# Patient Record
Sex: MALE | Race: WHITE | Employment: FULL TIME | URBAN - METROPOLITAN AREA
[De-identification: names, ages, dates, MRNs, and addresses within clinical notes are randomized per-mention and may not be internally consistent; named-entity substitution may affect disease eponyms.]

---

## 2019-11-22 ENCOUNTER — HOSPITAL ENCOUNTER (EMERGENCY)
Age: 29
Discharge: HOME | End: 2019-11-22
Attending: EMERGENCY MEDICINE
Payer: COMMERCIAL

## 2019-11-22 ENCOUNTER — APPOINTMENT (OUTPATIENT)
Dept: ULTRASOUND IMAGING | Age: 29
End: 2019-11-22
Attending: NURSE PRACTITIONER
Payer: COMMERCIAL

## 2019-11-22 ENCOUNTER — APPOINTMENT (OUTPATIENT)
Dept: GENERAL RADIOLOGY | Age: 29
End: 2019-11-22
Attending: NURSE PRACTITIONER
Payer: COMMERCIAL

## 2019-11-22 ENCOUNTER — APPOINTMENT (OUTPATIENT)
Dept: CT IMAGING | Age: 29
End: 2019-11-22
Attending: EMERGENCY MEDICINE
Payer: COMMERCIAL

## 2019-11-22 VITALS
WEIGHT: 315 LBS | DIASTOLIC BLOOD PRESSURE: 71 MMHG | RESPIRATION RATE: 16 BRPM | OXYGEN SATURATION: 96 % | BODY MASS INDEX: 44.1 KG/M2 | HEIGHT: 71 IN | TEMPERATURE: 98.6 F | HEART RATE: 114 BPM | SYSTOLIC BLOOD PRESSURE: 142 MMHG

## 2019-11-22 DIAGNOSIS — R51.9 NONINTRACTABLE EPISODIC HEADACHE, UNSPECIFIED HEADACHE TYPE: ICD-10-CM

## 2019-11-22 DIAGNOSIS — R74.01 TRANSAMINITIS: Primary | ICD-10-CM

## 2019-11-22 DIAGNOSIS — B27.90 INFECTIOUS MONONUCLEOSIS WITHOUT COMPLICATION, INFECTIOUS MONONUCLEOSIS DUE TO UNSPECIFIED ORGANISM: ICD-10-CM

## 2019-11-22 LAB
ALBUMIN SERPL-MCNC: 3.3 G/DL (ref 3.5–5)
ALBUMIN/GLOB SERPL: 0.8 {RATIO} (ref 1.1–2.2)
ALP SERPL-CCNC: 184 U/L (ref 45–117)
ALT SERPL-CCNC: 366 U/L (ref 12–78)
ANION GAP SERPL CALC-SCNC: 8 MMOL/L (ref 5–15)
APPEARANCE UR: CLEAR
AST SERPL-CCNC: 212 U/L (ref 15–37)
BASOPHILS # BLD: 0 K/UL (ref 0–0.1)
BASOPHILS NFR BLD: 1 % (ref 0–1)
BILIRUB SERPL-MCNC: 0.6 MG/DL (ref 0.2–1)
BILIRUB UR QL: NEGATIVE
BUN SERPL-MCNC: 13 MG/DL (ref 6–20)
BUN/CREAT SERPL: 15 (ref 12–20)
CALCIUM SERPL-MCNC: 8.6 MG/DL (ref 8.5–10.1)
CHLORIDE SERPL-SCNC: 103 MMOL/L (ref 97–108)
CO2 SERPL-SCNC: 26 MMOL/L (ref 21–32)
COLOR UR: NORMAL
COMMENT, HOLDF: NORMAL
CREAT SERPL-MCNC: 0.88 MG/DL (ref 0.7–1.3)
DIFFERENTIAL METHOD BLD: ABNORMAL
EOSINOPHIL # BLD: 0 K/UL (ref 0–0.4)
EOSINOPHIL NFR BLD: 0 % (ref 0–7)
ERYTHROCYTE [DISTWIDTH] IN BLOOD BY AUTOMATED COUNT: 14.9 % (ref 11.5–14.5)
FLUAV AG NPH QL IA: NEGATIVE
FLUBV AG NOSE QL IA: NEGATIVE
GLOBULIN SER CALC-MCNC: 4.3 G/DL (ref 2–4)
GLUCOSE SERPL-MCNC: 95 MG/DL (ref 65–100)
GLUCOSE UR STRIP.AUTO-MCNC: NEGATIVE MG/DL
HAV IGM SER QL: NONREACTIVE
HBV CORE IGM SER QL: NONREACTIVE
HBV SURFACE AG SER QL: <0.1 INDEX
HBV SURFACE AG SER QL: NEGATIVE
HCT VFR BLD AUTO: 50.6 % (ref 36.6–50.3)
HCV AB SERPL QL IA: NONREACTIVE
HCV COMMENT,HCGAC: NORMAL
HETEROPH AB BLD QL IA: POSITIVE
HGB BLD-MCNC: 16.8 G/DL (ref 12.1–17)
HGB UR QL STRIP: NEGATIVE
IMM GRANULOCYTES # BLD AUTO: 0 K/UL
IMM GRANULOCYTES NFR BLD AUTO: 0 %
KETONES UR QL STRIP.AUTO: NEGATIVE MG/DL
LEUKOCYTE ESTERASE UR QL STRIP.AUTO: NEGATIVE
LIPASE SERPL-CCNC: 104 U/L (ref 73–393)
LYMPHOCYTES # BLD: 1.5 K/UL (ref 0.8–3.5)
LYMPHOCYTES NFR BLD: 32 % (ref 12–49)
MCH RBC QN AUTO: 28.3 PG (ref 26–34)
MCHC RBC AUTO-ENTMCNC: 33.2 G/DL (ref 30–36.5)
MCV RBC AUTO: 85.2 FL (ref 80–99)
MONOCYTES # BLD: 0.8 K/UL (ref 0–1)
MONOCYTES NFR BLD: 17 % (ref 5–13)
NEUTS SEG # BLD: 2.3 K/UL (ref 1.8–8)
NEUTS SEG NFR BLD: 50 % (ref 32–75)
NITRITE UR QL STRIP.AUTO: NEGATIVE
NRBC # BLD: 0 K/UL (ref 0–0.01)
NRBC BLD-RTO: 0 PER 100 WBC
PH UR STRIP: 5.5 [PH] (ref 5–8)
PLATELET # BLD AUTO: 437 K/UL (ref 150–400)
PMV BLD AUTO: 8.9 FL (ref 8.9–12.9)
POTASSIUM SERPL-SCNC: 4.2 MMOL/L (ref 3.5–5.1)
PROT SERPL-MCNC: 7.6 G/DL (ref 6.4–8.2)
PROT UR STRIP-MCNC: NEGATIVE MG/DL
RBC # BLD AUTO: 5.94 M/UL (ref 4.1–5.7)
RBC MORPH BLD: ABNORMAL
SAMPLES BEING HELD,HOLD: NORMAL
SODIUM SERPL-SCNC: 137 MMOL/L (ref 136–145)
SP GR UR REFRACTOMETRY: 1.02 (ref 1–1.03)
SP1: NORMAL
SP2: NORMAL
SP3: NORMAL
UROBILINOGEN UR QL STRIP.AUTO: 0.2 EU/DL (ref 0.2–1)
WBC # BLD AUTO: 4.6 K/UL (ref 4.1–11.1)
WBC MORPH BLD: ABNORMAL

## 2019-11-22 PROCEDURE — 81003 URINALYSIS AUTO W/O SCOPE: CPT

## 2019-11-22 PROCEDURE — 70450 CT HEAD/BRAIN W/O DYE: CPT

## 2019-11-22 PROCEDURE — 76705 ECHO EXAM OF ABDOMEN: CPT

## 2019-11-22 PROCEDURE — 96361 HYDRATE IV INFUSION ADD-ON: CPT

## 2019-11-22 PROCEDURE — 74011250636 HC RX REV CODE- 250/636: Performed by: NURSE PRACTITIONER

## 2019-11-22 PROCEDURE — 99285 EMERGENCY DEPT VISIT HI MDM: CPT

## 2019-11-22 PROCEDURE — 85025 COMPLETE CBC W/AUTO DIFF WBC: CPT

## 2019-11-22 PROCEDURE — 74011636320 HC RX REV CODE- 636/320: Performed by: RADIOLOGY

## 2019-11-22 PROCEDURE — 96360 HYDRATION IV INFUSION INIT: CPT

## 2019-11-22 PROCEDURE — 80074 ACUTE HEPATITIS PANEL: CPT

## 2019-11-22 PROCEDURE — 83690 ASSAY OF LIPASE: CPT

## 2019-11-22 PROCEDURE — 86308 HETEROPHILE ANTIBODY SCREEN: CPT

## 2019-11-22 PROCEDURE — 74177 CT ABD & PELVIS W/CONTRAST: CPT

## 2019-11-22 PROCEDURE — 87804 INFLUENZA ASSAY W/OPTIC: CPT

## 2019-11-22 PROCEDURE — 36415 COLL VENOUS BLD VENIPUNCTURE: CPT

## 2019-11-22 PROCEDURE — 80053 COMPREHEN METABOLIC PANEL: CPT

## 2019-11-22 PROCEDURE — 71046 X-RAY EXAM CHEST 2 VIEWS: CPT

## 2019-11-22 PROCEDURE — 74011250636 HC RX REV CODE- 250/636: Performed by: EMERGENCY MEDICINE

## 2019-11-22 PROCEDURE — 74011250637 HC RX REV CODE- 250/637: Performed by: EMERGENCY MEDICINE

## 2019-11-22 RX ORDER — KETOROLAC TROMETHAMINE 30 MG/ML
30 INJECTION, SOLUTION INTRAMUSCULAR; INTRAVENOUS ONCE
Status: DISCONTINUED | OUTPATIENT
Start: 2019-11-22 | End: 2019-11-23 | Stop reason: HOSPADM

## 2019-11-22 RX ORDER — DIPHENHYDRAMINE HCL 25 MG
25 CAPSULE ORAL
Status: DISCONTINUED | OUTPATIENT
Start: 2019-11-22 | End: 2019-11-23 | Stop reason: HOSPADM

## 2019-11-22 RX ORDER — METOCLOPRAMIDE 10 MG/1
10 TABLET ORAL
Status: DISCONTINUED | OUTPATIENT
Start: 2019-11-22 | End: 2019-11-23 | Stop reason: HOSPADM

## 2019-11-22 RX ORDER — DEXAMETHASONE SODIUM PHOSPHATE 4 MG/ML
10 INJECTION, SOLUTION INTRA-ARTICULAR; INTRALESIONAL; INTRAMUSCULAR; INTRAVENOUS; SOFT TISSUE
Status: DISCONTINUED | OUTPATIENT
Start: 2019-11-22 | End: 2019-11-23 | Stop reason: HOSPADM

## 2019-11-22 RX ORDER — IBUPROFEN 800 MG/1
800 TABLET ORAL
Status: COMPLETED | OUTPATIENT
Start: 2019-11-22 | End: 2019-11-22

## 2019-11-22 RX ADMIN — SODIUM CHLORIDE 1000 ML: 900 INJECTION, SOLUTION INTRAVENOUS at 19:47

## 2019-11-22 RX ADMIN — IBUPROFEN 800 MG: 800 TABLET, FILM COATED ORAL at 20:36

## 2019-11-22 RX ADMIN — SODIUM CHLORIDE 1000 ML: 900 INJECTION, SOLUTION INTRAVENOUS at 16:44

## 2019-11-22 RX ADMIN — IOPAMIDOL 100 ML: 755 INJECTION, SOLUTION INTRAVENOUS at 18:47

## 2019-11-22 NOTE — ED PROVIDER NOTES
I have evaluated the patient as the Provider in Triage. I have reviewed His vital signs and the triage nurse assessment. I have talked with the patient and any available family and advised that I am the provider in triage and have ordered the appropriate study to initiate their work up based on the clinical presentation during my assessment. I have advised that the patient will be accommodated in the Main ED as soon as possible. I have also requested to contact the triage nurse or myself immediately if the patient experiences any changes in their condition during this brief waiting period. Note written by Davonte Gunn, as dictated by Samira Adams NP 4:12 PM    ----------------------------    Date of Service:  11/22/2019    Patient:  Angel Washington    Chief Complaint:  Mass; Fever; and Elevated Liver Enzymes     HPI:  nAgel Washington is a 34 y.o. male. Pt was sent to the ED by Dr. Cindi Lopez from [facility] to be evaluated for elevated LFTs and swollen lymph nodes. Followed by Dr. Cindi Lopez for mild hypothyroidism. Pt reports having swollen lymph nodes on the left side of his face for 2-3 weeks, intermittent tremors, sinus pressure, throbbing headache, diaphoretic episodes upon waking, some chest pressure accompanied by 1 episode of emesis earlier today following food intake, and unstable body temperatures lately. Notes chest pressure has subsided. To elaborate on temperatures, mother reports the pt has been febrile up to 100.2F but also down to 91F. Has been taking oral temperatures using a thermometer they bought yesterday. As per the family, the pt has been seen numerous times for his current troubles. Pt is currently on his 3rd round of abx after being initially treated for bee stings and then twice for sinus infection. States he has been on keflex, doxycycline, and now on amoxicillin which he started on 11/21/19. States that his condition has been worsening each time he starts new medications. Had blood work drawn while seeing his PCP yesterday 11/21/19 which showed the elevated LFTs. Hx splenectomy. Pt is a  for a living and works 6 days a week. Pt denies abdominal pain, LE pain, visual disturbances, hearing changes, sore throat, recent international travel, prolonged periods of times outdoors, or any current pain. PCP: Gt Toure MD    Note written by Davonte Dasilva, as dictated by Dex West, DO 4:53PM.    The history is provided by the patient and a parent. No  was used. Past Medical History:   Diagnosis Date    ITP (idiopathic thrombocytopenic purpura) ([facility])        Past Surgical History:   Procedure Laterality Date    HX ADENOIDECTOMY      HX SPLENECTOMY      HX TONSILLECTOMY      HX WISDOM TEETH EXTRACTION           No family history on file.     Social History     Socioeconomic History    Marital status: SINGLE     Spouse name: Not on file    Number of children: Not on file    Years of education: Not on file    Highest education level: Not on file   Occupational History    Not on file   Social Needs    Financial resource strain: Not on file    Food insecurity:     Worry: Not on file     Inability: Not on file    Transportation needs:     Medical: Not on file     Non-medical: Not on file   Tobacco Use    Smoking status: Former Smoker   Substance and Sexual Activity    Alcohol use: Yes     Comment: rarely    Drug use: Not on file    Sexual activity: Not on file   Lifestyle    Physical activity:     Days per week: Not on file     Minutes per session: Not on file    Stress: Not on file   Relationships    Social connections:     Talks on phone: Not on file     Gets together: Not on file     Attends Sikh service: Not on file     Active member of club or organization: Not on file     Attends meetings of clubs or organizations: Not on file     Relationship status: Not on file    Intimate partner violence:     Fear of current or ex partner: Not on file     Emotionally abused: Not on file     Physically abused: Not on file     Forced sexual activity: Not on file   Other Topics Concern    Not on file   Social History Narrative    Not on file         ALLERGIES: Levaquin [levofloxacin]    Review of Systems   Constitutional: Positive for diaphoresis and fever. HENT: Positive for facial swelling and sinus pressure. Negative for hearing loss and sore throat. Eyes: Negative for visual disturbance. Respiratory: Negative for shortness of breath. Cardiovascular: Positive for chest pain. Gastrointestinal: Positive for vomiting. Negative for abdominal pain. Genitourinary: Negative for flank pain. Musculoskeletal: Negative for back pain. Skin: Negative for rash. Neurological: Positive for tremors and headaches. Negative for dizziness and light-headedness. Psychiatric/Behavioral: Negative for suicidal ideas. All other systems reviewed and are negative. Vitals:    11/22/19 1647 11/22/19 1652 11/22/19 1701 11/22/19 1815   BP:  123/76 145/69 142/71   Pulse: (!) 105  (!) 114    Resp: 20  16    Temp: 99 °F (37.2 °C)  98.6 °F (37 °C)    SpO2:  97% 96% 96%   Weight:       Height:                Physical Exam  Constitutional:       General: He is not in acute distress. Appearance: He is well-developed. HENT:      Head: Normocephalic and atraumatic. Right Ear: Tympanic membrane normal.      Left Ear: Tympanic membrane normal.      Nose: Nose normal.      Mouth/Throat:      Mouth: Mucous membranes are dry. Pharynx: No oropharyngeal exudate or posterior oropharyngeal erythema. Eyes:      General: No scleral icterus. Extraocular Movements: Extraocular movements intact. Conjunctiva/sclera: Conjunctivae normal.      Pupils: Pupils are equal, round, and reactive to light. Neck:      Musculoskeletal: Neck supple. No neck rigidity or muscular tenderness. Vascular: No JVD.       Trachea: No tracheal deviation. Cardiovascular:      Rate and Rhythm: Normal rate and regular rhythm. Heart sounds: No murmur. No friction rub. Pulmonary:      Effort: Pulmonary effort is normal. No respiratory distress. Breath sounds: Normal breath sounds. Abdominal:      General: There is no distension. Palpations: Abdomen is soft. Tenderness: There is no tenderness. Musculoskeletal: Normal range of motion. General: No swelling, tenderness or deformity. Right lower leg: No edema. Left lower leg: No edema. Lymphadenopathy:      Comments: Left sided cervical chain of lymph nodes   Skin:     General: Skin is warm and dry. Capillary Refill: Capillary refill takes less than 2 seconds. Coloration: Skin is not jaundiced. Findings: No rash. Neurological:      General: No focal deficit present. Mental Status: He is alert and oriented to person, place, and time. Mental status is at baseline. Psychiatric:         Behavior: Behavior normal.         Thought Content: Thought content normal.         Judgment: Judgment normal.     Note written by Davonte Edmondson, as dictated by No att. providers found 4:53PM       MDM  Number of Diagnoses or Management Options  Infectious mononucleosis without complication, infectious mononucleosis due to unspecified organism:   Nonintractable episodic headache, unspecified headache type:   Transaminitis:        VITAL SIGNS:  No data found.       LABS:  Recent Results (from the past 6 hour(s))   INFLUENZA A & B AG (RAPID TEST)    Collection Time: 11/22/19  4:47 PM   Result Value Ref Range    Influenza A Antigen NEGATIVE  NEG      Influenza B Antigen NEGATIVE  NEG     URINALYSIS W/ RFLX MICROSCOPIC    Collection Time: 11/22/19  6:14 PM   Result Value Ref Range    Color YELLOW/STRAW      Appearance CLEAR CLEAR      Specific gravity 1.017 1.003 - 1.030      pH (UA) 5.5 5.0 - 8.0      Protein NEGATIVE  NEG mg/dL    Glucose NEGATIVE  NEG mg/dL    Ketone NEGATIVE  NEG mg/dL    Bilirubin NEGATIVE  NEG      Blood NEGATIVE  NEG      Urobilinogen 0.2 0.2 - 1.0 EU/dL    Nitrites NEGATIVE  NEG      Leukocyte Esterase NEGATIVE  NEG          IMAGING:  CT HEAD WO CONT   Final Result   IMPRESSION:   1. No evidence of intracranial abnormality. CT ABD PELV W CONT   Final Result   IMPRESSION: No acute findings or findings to correlate with transaminitis. Status post splenectomy with probable postsurgical changes in small splenule at   the pancreatic tail. US ABD LTD   Final Result   IMPRESSION: Partially distended gallbladder and obscured pancreas. No acute   findings. XR CHEST PA LAT   Final Result   Impression: No acute process. No pneumonia               Medications During Visit:  Medications   ketorolac (TORADOL) injection 30 mg (has no administration in time range)   dexamethasone (DECADRON) 4 mg/mL injection 10 mg (has no administration in time range)   diphenhydrAMINE (BENADRYL) capsule 25 mg (has no administration in time range)   metoclopramide HCl (REGLAN) tablet 10 mg (has no administration in time range)   sodium chloride 0.9 % bolus infusion 1,000 mL (0 mL IntraVENous IV Completed 11/22/19 2136)   iopamidol (ISOVUE-370) 76 % injection 100 mL (100 mL IntraVENous Given 11/22/19 1847)   sodium chloride 0.9 % bolus infusion 1,000 mL (0 mL IntraVENous IV Completed 11/22/19 2135)   ibuprofen (MOTRIN) tablet 800 mg (800 mg Oral Given 11/22/19 2036)         DECISION MAKING:  Madison Mckeon is a 34 y.o. male who comes in as above. Here, labs and imaging are as above. Based on the patient's multiple complaints additional imaging was obtained. Is felt that the patient does have mononucleosis which would be the cause of his transaminitis. As he has no other risk factors for hepatitis or any other acute to discharge him home. He will be provided with medication for his headache. .  At this time he is instructed to follow-up with his family doctor for repeat blood work next week and to follow-up on his transaminitis. He feels comfortable to follow-up as an outpatient as is and will return as needed      IMPRESSION:  1. Transaminitis    2. Infectious mononucleosis without complication, infectious mononucleosis due to unspecified organism    3. Nonintractable episodic headache, unspecified headache type        DISPOSITION:  Discharged      Discharge Medication List as of 11/22/2019  8:44 PM           Follow-up Information     Follow up With Specialties Details Why Contact Melba Delgadillo MD Family Practice Schedule an appointment as soon as possible for a visit   [id number] [address] [facility]  [phone]              The patient is asked to follow-up with their primary care provider in the next several days. They are to call tomorrow for an appointment. The patient is asked to return promptly for any increased concerns or worsening of symptoms. They can return to this emergency department or any other emergency department.     Procedures

## 2019-11-22 NOTE — ED TRIAGE NOTES
Sent here by Dr. Rafaela Santana from [facility] to be evaluated for elevated LFT's, swollen lymph nodes, and report of feeling generally unwell x 1 month. Pt does not have spleen. Mother reports wildly varying temperatures.

## 2021-11-04 ENCOUNTER — HOSPITAL ENCOUNTER (EMERGENCY)
Age: 31
Discharge: HOME | End: 2021-11-04
Attending: EMERGENCY MEDICINE
Payer: COMMERCIAL

## 2021-11-04 ENCOUNTER — APPOINTMENT (OUTPATIENT)
Dept: GENERAL RADIOLOGY | Age: 31
End: 2021-11-04
Attending: EMERGENCY MEDICINE
Payer: COMMERCIAL

## 2021-11-04 VITALS
SYSTOLIC BLOOD PRESSURE: 142 MMHG | OXYGEN SATURATION: 99 % | DIASTOLIC BLOOD PRESSURE: 92 MMHG | BODY MASS INDEX: 44.1 KG/M2 | WEIGHT: 315 LBS | HEIGHT: 71 IN | TEMPERATURE: 97.1 F | HEART RATE: 89 BPM | RESPIRATION RATE: 18 BRPM

## 2021-11-04 DIAGNOSIS — M54.6 ACUTE MIDLINE THORACIC BACK PAIN: Primary | ICD-10-CM

## 2021-11-04 DIAGNOSIS — M79.89 LEG SWELLING: ICD-10-CM

## 2021-11-04 LAB
ANION GAP SERPL CALC-SCNC: 6 MMOL/L (ref 5–15)
BASOPHILS # BLD: 0.1 K/UL (ref 0–0.1)
BASOPHILS NFR BLD: 1 % (ref 0–1)
BNP SERPL-MCNC: 14 PG/ML
BUN SERPL-MCNC: 11 MG/DL (ref 6–20)
BUN/CREAT SERPL: 14 (ref 12–20)
CALCIUM SERPL-MCNC: 9.4 MG/DL (ref 8.5–10.1)
CHLORIDE SERPL-SCNC: 102 MMOL/L (ref 97–108)
CO2 SERPL-SCNC: 29 MMOL/L (ref 21–32)
COMMENT, HOLDF: NORMAL
CREAT SERPL-MCNC: 0.79 MG/DL (ref 0.7–1.3)
D DIMER PPP FEU-MCNC: 0.21 MG/L FEU (ref 0–0.65)
DIFFERENTIAL METHOD BLD: ABNORMAL
EOSINOPHIL # BLD: 0 K/UL (ref 0–0.4)
EOSINOPHIL NFR BLD: 0 % (ref 0–7)
ERYTHROCYTE [DISTWIDTH] IN BLOOD BY AUTOMATED COUNT: 14.6 % (ref 11.5–14.5)
GLUCOSE SERPL-MCNC: 95 MG/DL (ref 65–100)
HCT VFR BLD AUTO: 50.4 % (ref 36.6–50.3)
HGB BLD-MCNC: 16.2 G/DL (ref 12.1–17)
IMM GRANULOCYTES # BLD AUTO: 0 K/UL
IMM GRANULOCYTES NFR BLD AUTO: 0 %
LYMPHOCYTES # BLD: 5.2 K/UL (ref 0.8–3.5)
LYMPHOCYTES NFR BLD: 38 % (ref 12–49)
MCH RBC QN AUTO: 27.8 PG (ref 26–34)
MCHC RBC AUTO-ENTMCNC: 32.1 G/DL (ref 30–36.5)
MCV RBC AUTO: 86.6 FL (ref 80–99)
MONOCYTES # BLD: 1.2 K/UL (ref 0–1)
MONOCYTES NFR BLD: 9 % (ref 5–13)
NEUTS SEG # BLD: 7.3 K/UL (ref 1.8–8)
NEUTS SEG NFR BLD: 52 % (ref 32–75)
NRBC # BLD: 0 K/UL (ref 0–0.01)
NRBC BLD-RTO: 0 PER 100 WBC
PLATELET # BLD AUTO: 524 K/UL (ref 150–400)
PLATELET COMMENTS,PCOM: ABNORMAL
PMV BLD AUTO: 8.3 FL (ref 8.9–12.9)
POTASSIUM SERPL-SCNC: 4.3 MMOL/L (ref 3.5–5.1)
RBC # BLD AUTO: 5.82 M/UL (ref 4.1–5.7)
RBC MORPH BLD: ABNORMAL
SAMPLES BEING HELD,HOLD: NORMAL
SODIUM SERPL-SCNC: 137 MMOL/L (ref 136–145)
TROPONIN-HIGH SENSITIVITY: 5 NG/L (ref 0–76)
WBC # BLD AUTO: 13.8 K/UL (ref 4.1–11.1)
WBC MORPH BLD: ABNORMAL

## 2021-11-04 PROCEDURE — 99282 EMERGENCY DEPT VISIT SF MDM: CPT

## 2021-11-04 PROCEDURE — 84484 ASSAY OF TROPONIN QUANT: CPT

## 2021-11-04 PROCEDURE — 36415 COLL VENOUS BLD VENIPUNCTURE: CPT

## 2021-11-04 PROCEDURE — 85025 COMPLETE CBC W/AUTO DIFF WBC: CPT

## 2021-11-04 PROCEDURE — 83880 ASSAY OF NATRIURETIC PEPTIDE: CPT

## 2021-11-04 PROCEDURE — 93005 ELECTROCARDIOGRAM TRACING: CPT

## 2021-11-04 PROCEDURE — 80048 BASIC METABOLIC PNL TOTAL CA: CPT

## 2021-11-04 PROCEDURE — 85379 FIBRIN DEGRADATION QUANT: CPT

## 2021-11-04 PROCEDURE — 71046 X-RAY EXAM CHEST 2 VIEWS: CPT

## 2021-11-04 RX ORDER — IBUPROFEN 600 MG/1
600 TABLET ORAL
Qty: 20 TABLET | Refills: 0 | Status: SHIPPED | OUTPATIENT
Start: 2021-11-04

## 2021-11-04 RX ORDER — KETOROLAC TROMETHAMINE 30 MG/ML
30 INJECTION, SOLUTION INTRAMUSCULAR; INTRAVENOUS
Status: DISCONTINUED | OUTPATIENT
Start: 2021-11-04 | End: 2021-11-04 | Stop reason: HOSPADM

## 2021-11-04 NOTE — ED TRIAGE NOTES
Patient reports lower back pain with swelling on both ankles and right shoulder pain for about 2 weeks.      Patient denies SOB, CP.

## 2021-11-04 NOTE — ED PROVIDER NOTES
Figueroa Bowers is a 31 yo M with upper back pain, bilateral ankle pain and swelling. He states that he has had pain and swelling in his bilateral legs for months but this afternoon he developed aching pain in his upper back between his shoulders. Nothing makes the pain better or worse. He denies chest pain or shortness of breath. Past Medical History:   Diagnosis Date    ITP (idiopathic thrombocytopenic purpura) (HCC)        Past Surgical History:   Procedure Laterality Date    HX ADENOIDECTOMY      HX SPLENECTOMY      HX TONSILLECTOMY      HX WISDOM TEETH EXTRACTION           No family history on file. Social History     Socioeconomic History    Marital status: SINGLE     Spouse name: Not on file    Number of children: Not on file    Years of education: Not on file    Highest education level: Not on file   Occupational History    Not on file   Tobacco Use    Smoking status: Former Smoker    Smokeless tobacco: Not on file   Substance and Sexual Activity    Alcohol use: Yes     Comment: rarely    Drug use: Not on file    Sexual activity: Not on file   Other Topics Concern    Not on file   Social History Narrative    Not on file     Social Determinants of Health     Financial Resource Strain:     Difficulty of Paying Living Expenses: Not on file   Food Insecurity:     Worried About Running Out of Food in the Last Year: Not on file    Terrell of Food in the Last Year: Not on file   Transportation Needs:     Lack of Transportation (Medical): Not on file    Lack of Transportation (Non-Medical):  Not on file   Physical Activity:     Days of Exercise per Week: Not on file    Minutes of Exercise per Session: Not on file   Stress:     Feeling of Stress : Not on file   Social Connections:     Frequency of Communication with Friends and Family: Not on file    Frequency of Social Gatherings with Friends and Family: Not on file    Attends Congregational Services: Not on file    Active Member of Clubs or Organizations: Not on file    Attends Club or Organization Meetings: Not on file    Marital Status: Not on file   Intimate Partner Violence:     Fear of Current or Ex-Partner: Not on file    Emotionally Abused: Not on file    Physically Abused: Not on file    Sexually Abused: Not on file   Housing Stability:     Unable to Pay for Housing in the Last Year: Not on file    Number of [place] in the Last Year: Not on file    Unstable Housing in the Last Year: Not on file         ALLERGIES: Levaquin [levofloxacin]    Review of Systems   Constitutional: Negative for fever. HENT: Negative for sore throat. Eyes: Negative for visual disturbance. Respiratory: Negative for cough. Cardiovascular: Positive for leg swelling. Negative for chest pain. Gastrointestinal: Negative for abdominal pain. Genitourinary: Negative for dysuria. Musculoskeletal: Positive for back pain. Skin: Negative for rash. Neurological: Negative for headaches. Vitals:    11/04/21 1759   BP: (!) 142/92   Pulse: 89   Resp: 18   Temp: 97.1 °F (36.2 °C)   SpO2: 99%   Weight: (!) 187.6 kg (413 lb 9.3 oz)   Height: 5' 11\" (1.803 m)            Physical Exam  Vitals and nursing note reviewed. Constitutional:       General: He is not in acute distress. Appearance: He is well-developed. He is obese. HENT:      Head: Normocephalic and atraumatic. Eyes:      Conjunctiva/sclera: Conjunctivae normal.   Neck:      Trachea: Phonation normal.   Cardiovascular:      Rate and Rhythm: Normal rate. Heart sounds: Normal heart sounds. Pulmonary:      Effort: Pulmonary effort is normal. No respiratory distress. Breath sounds: No wheezing or rales. Abdominal:      General: There is no distension. Musculoskeletal:         General: No tenderness. Normal range of motion. Cervical back: Normal range of motion. Skin:     General: Skin is warm and dry. Neurological:      Mental Status: He is alert.  He is not disoriented. Motor: No abnormal muscle tone. ED EKG interpretation:  Rhythm: normal sinus rhythm; and regular . Rate (approx.): 86; Axis: normal; P wave: normal; QRS interval: normal ; ST/T wave: normal; Other findings: normal. This EKG was interpreted by Maricruz Brito MD,ED Provider. MDM         7:30 PM  PAtient reassessed and remains in no distress, Labs reviewed and ddimer, trop, BNP normal.  CXR normal.  Will discharge home with prescription for ibuprofen.    Procedures

## 2021-11-05 LAB
ATRIAL RATE: 86 BPM
CALCULATED P AXIS, ECG09: 55 DEGREES
CALCULATED R AXIS, ECG10: 22 DEGREES
CALCULATED T AXIS, ECG11: 21 DEGREES
DIAGNOSIS, 93000: NORMAL
P-R INTERVAL, ECG05: 154 MS
Q-T INTERVAL, ECG07: 378 MS
QRS DURATION, ECG06: 104 MS
QTC CALCULATION (BEZET), ECG08: 452 MS
VENTRICULAR RATE, ECG03: 86 BPM

## 2022-03-30 ENCOUNTER — HOSPITAL ENCOUNTER (EMERGENCY)
Age: 32
Discharge: HOME | End: 2022-03-30
Attending: STUDENT IN AN ORGANIZED HEALTH CARE EDUCATION/TRAINING PROGRAM
Payer: COMMERCIAL

## 2022-03-30 VITALS
OXYGEN SATURATION: 97 % | HEIGHT: 71 IN | DIASTOLIC BLOOD PRESSURE: 88 MMHG | HEART RATE: 104 BPM | WEIGHT: 315 LBS | BODY MASS INDEX: 44.1 KG/M2 | RESPIRATION RATE: 20 BRPM | TEMPERATURE: 98.9 F | SYSTOLIC BLOOD PRESSURE: 131 MMHG

## 2022-03-30 DIAGNOSIS — H66.006 RECURRENT ACUTE SUPPURATIVE OTITIS MEDIA WITHOUT SPONTANEOUS RUPTURE OF TYMPANIC MEMBRANE OF BOTH SIDES: Primary | ICD-10-CM

## 2022-03-30 DIAGNOSIS — R05.9 COUGH: ICD-10-CM

## 2022-03-30 PROCEDURE — 99283 EMERGENCY DEPT VISIT LOW MDM: CPT

## 2022-03-30 RX ORDER — FLUTICASONE PROPIONATE 50 MCG
2 SPRAY, SUSPENSION (ML) NASAL DAILY
Qty: 16 G | Refills: 0 | Status: SHIPPED | OUTPATIENT
Start: 2022-03-30

## 2022-03-30 NOTE — ED TRIAGE NOTES
Pt presents to ER with c/o b/l ear pain. Seen at PCP 3 weeks ago and completed augmentin and prednisone dose pain for b/l OM. Seen at [facility] 3 days ago for worsening ear pain and diagnosed with b/l ear pain and given doxycyline and tessalon without improvement. Pt c/o cough and sore throat with b/l ear pain. Pt is waiting for ENT appt. Pt denies any fevers, chills, body aches, dizziness, SOB.

## 2022-03-30 NOTE — ED PROVIDER NOTES
Please note that this dictation was completed with JumpTime, the computer voice recognition software.  Quite often unanticipated grammatical, syntax, homophones, and other interpretive errors are inadvertently transcribed by the computer software.  Please disregard these errors.  Please excuse any errors that have escaped final proofreading. Patient is a 72-year-old male with history of ITP status post splenectomy presenting to ED for evaluation of bilateral ear pain. He states that this pain started about 1 month ago, was treated with a course of Augmentin by his PCP with relief in his symptoms but feels like his pain returned 4 days ago. [facility] and was started on doxycycline for recurrent ear infection and feels slightly improved but that his ear pain is still present. He is also had a slight cough for the past 4 weeks, denies concern for Covid as he tested positive for Covid in January 2022. He has an ENT appointment scheduled in 2 days. He denies fever, chills, facial swelling, decreased hearing, trouble swallowing, shortness of breath, chest pain, or any other medical complaints at this time. Past Medical History:   Diagnosis Date    ITP (idiopathic thrombocytopenic purpura) (HCC)        Past Surgical History:   Procedure Laterality Date    HX ADENOIDECTOMY      HX SPLENECTOMY      HX TONSILLECTOMY      HX WISDOM TEETH EXTRACTION           No family history on file.     Social History     Socioeconomic History    Marital status: SINGLE     Spouse name: Not on file    Number of children: Not on file    Years of education: Not on file    Highest education level: Not on file   Occupational History    Not on file   Tobacco Use    Smoking status: Former Smoker    Smokeless tobacco: Not on file   Substance and Sexual Activity    Alcohol use: Yes     Comment: rarely    Drug use: Not on file    Sexual activity: Not on file   Other Topics Concern    Not on file   Social History Narrative    Not on file     Social Determinants of Health     Financial Resource Strain:     Difficulty of Paying Living Expenses: Not on file   Food Insecurity:     Worried About Running Out of Food in the Last Year: Not on file    Terrell of Food in the Last Year: Not on file   Transportation Needs:     Lack of Transportation (Medical): Not on file    Lack of Transportation (Non-Medical): Not on file   Physical Activity:     Days of Exercise per Week: Not on file    Minutes of Exercise per Session: Not on file   Stress:     Feeling of Stress : Not on file   Social Connections:     Frequency of Communication with Friends and Family: Not on file    Frequency of Social Gatherings with Friends and Family: Not on file    Attends Mormon Services: Not on file    Active Member of [address] [place] [address] [employer] or Organizations: Not on file    Attends Club or Organization Meetings: Not on file    Marital Status: Not on file   Intimate Partner Violence:     Fear of Current or Ex-Partner: Not on file    Emotionally Abused: Not on file    Physically Abused: Not on file    Sexually Abused: Not on file   Housing Stability:     Unable to Pay for Housing in the Last Year: Not on file    Number of [place] in the Last Year: Not on file    Unstable Housing in the Last Year: Not on file         ALLERGIES: Levaquin [levofloxacin]    Review of Systems   Constitutional: Negative for chills and fever. HENT: Positive for ear pain. Negative for sore throat. Eyes: Negative for visual disturbance. Respiratory: Positive for cough. Negative for shortness of breath. Cardiovascular: Negative for chest pain. Gastrointestinal: Negative for abdominal pain. Genitourinary: Negative for flank pain. Musculoskeletal: Negative for back pain. Skin: Negative for color change. Neurological: Negative for dizziness and headaches. Psychiatric/Behavioral: Negative for confusion.        Vitals:    03/30/22 8947 BP: 131/88   Pulse: (!) 104   Resp: 20   Temp: 98.9 °F (37.2 °C)   SpO2: 97%   Weight: (!) 181.4 kg (400 lb)   Height: 5' 11\" (1.803 m)            Physical Exam  Vitals and nursing note reviewed. Constitutional:       General: He is not in acute distress. Appearance: Normal appearance. He is not ill-appearing. HENT:      Head: Normocephalic and atraumatic. Jaw: There is normal jaw occlusion. Right Ear: Hearing normal. No drainage. No hemotympanum. Tympanic membrane is erythematous (faint). Tympanic membrane is not bulging. Left Ear: Hearing normal. No drainage. No hemotympanum. Tympanic membrane is erythematous (faint). Tympanic membrane is not bulging. Eyes:      General: Vision grossly intact. Extraocular Movements: Extraocular movements intact. Conjunctiva/sclera: Conjunctivae normal.   Neck:      Trachea: Phonation normal.   Cardiovascular:      Rate and Rhythm: Normal rate and regular rhythm. Heart sounds: Normal heart sounds. Pulmonary:      Effort: Pulmonary effort is normal.      Breath sounds: Normal breath sounds and air entry. Abdominal:      Palpations: Abdomen is soft. Tenderness: There is no abdominal tenderness. Musculoskeletal:         General: Normal range of motion. Cervical back: Normal range of motion. Skin:     General: Skin is warm and dry. Neurological:      General: No focal deficit present. Mental Status: He is alert and oriented to person, place, and time. Psychiatric:         Attention and Perception: Attention normal.         Mood and Affect: Mood normal.         Speech: Speech normal.          MDM  Number of Diagnoses or Management Options  Cough  Recurrent acute suppurative otitis media without spontaneous rupture of tympanic membrane of both sides  Diagnosis management comments: Patient is alert, afebrile, vitals stable. Presents with recurrent otitis media, has been on doxycycline for 1.5 days.   On exam he does have some very faint bilateral TM erythema, no bulging or drainage. No signs of mastoiditis, strep pharyngitis, or other concerning URI infx. Discussed changing him to cefdinir for better OM coverage, which he and his mother are hesitant to do so due to risk of leukopenia. Since his erythema is faint and likely improving, will continue doxycyline due to pt preference and will add on decongestants and Flonase for possible ET dysfunction as cause of his pain. Will f/u with ENT as scheduled in 2 days. Pt offered Covid testing which he delinces. Return precautions outlined. All questions answered at this time. 10:53 AM  Pt has been reevaluated. There are no new complaints, changes, or physical findings at this time. All results have been reviewed with patient and/or family. Medications have been reviewed w/ pt and/or family. Pt and/or family's questions have been answered. Pt and/or family expressed good understanding of the dx/tx/rx and is in agreement with plan of care. Pt instructed and agreed to f/u w/ ENT and to return to ED upon further deterioration. Pt is ready for discharge. IMPRESSION:  1. Recurrent acute suppurative otitis media without spontaneous rupture of tympanic membrane of both sides    2. Cough        PLAN:  1. Current Discharge Medication List      START taking these medications    Details   fluticasone propionate (FLONASE) 50 mcg/actuation nasal spray 2 Sprays by Both Nostrils route daily. Qty: 16 g, Refills: 0  Start date: 3/30/2022      owfzyrhxl-LJ-GD-acetaminophen 49--325 mg tab Take 1 Tablet by mouth two (2) times a day. Qty: 20 Tablet, Refills: 0  Start date: 3/30/2022           2.    Follow-up Information     Follow up With Specialties Details Why Contact Info    Beba Melton MD Otolaryngology Go on 4/1/2022 As scheduled [address] [phone]              Return to ED if worse     Procedures

## 2025-02-15 ENCOUNTER — OFFICE VISIT (OUTPATIENT)
Age: 35
End: 2025-02-15

## 2025-02-15 VITALS
HEART RATE: 87 BPM | BODY MASS INDEX: 44.1 KG/M2 | SYSTOLIC BLOOD PRESSURE: 123 MMHG | DIASTOLIC BLOOD PRESSURE: 89 MMHG | TEMPERATURE: 98.6 F | WEIGHT: 315 LBS | RESPIRATION RATE: 18 BRPM | HEIGHT: 71 IN | OXYGEN SATURATION: 97 %

## 2025-02-15 DIAGNOSIS — M25.562 ACUTE PAIN OF LEFT KNEE: Primary | ICD-10-CM

## 2025-02-15 RX ORDER — GUAIFENESIN AND DEXTROMETHORPHAN HBR 600; 30 MG/1; MG/1
TABLET, EXTENDED RELEASE ORAL
COMMUNITY
Start: 2025-01-22

## 2025-02-15 RX ORDER — DICLOFENAC SODIUM 75 MG/1
75 TABLET, DELAYED RELEASE ORAL 2 TIMES DAILY
Qty: 60 TABLET | Refills: 0 | Status: SHIPPED | OUTPATIENT
Start: 2025-02-15

## 2025-02-15 NOTE — PROGRESS NOTES
Santana Bueno (:  1990) is a 34 y.o. male,New patient, here for evaluation of the following chief complaint(s):  Knee Pain (Pt c/o Left knee pain started on Wednesday or Thursday. [place] a POP.)         Assessment & Plan  Acute pain of left knee    Highly suspicious for meniscal injury of the left knee, I recommend he be evaluated by ortho and possibly MRI to further assess. Patient is opting to head to [facility] urgent care rather than take referral. He is fitted with a stabilizing hinged brace and Voltaren is prescribed. Red flag signs and strict return / ER precautions were discussed, and the patient expressed understanding and agreement to the plan. All questions were answered.     Orders:    diclofenac (VOLTAREN) 75 MG EC tablet; Take 1 tablet by mouth 2 times daily    DME Order for (Specify) as OP      Return in about 2 weeks (around 3/1/2025), or if symptoms worsen or fail to improve.       Subjective   Patient comes in today due to left knee pain which he first noted about 2-3 days ago when he heard a pop noise and felt something \"twinge\" in his left knee. He has had issues on and off for years with the same knee. He doesn't normally take any meds or doing anything for pain at baseline. No history of trauma to that knee or inciting factor for today's visit - just happened all of a sudden.       History provided by:  Patient   used: No        Review of Systems   All other systems reviewed and are negative.         Objective   Physical Exam  Musculoskeletal:      Right knee: No LCL laxity, MCL laxity, ACL laxity or PCL laxity.      Left knee: Tenderness present over the medial joint line. No LCL laxity, MCL laxity, ACL laxity or PCL laxity.     Instability Tests: Medial Urbano test positive. Lateral Urbano test negative.            An electronic signature was used to authenticate this note.    --Manny Herrera, DO

## 2025-08-24 ENCOUNTER — HOSPITAL ENCOUNTER (EMERGENCY)
Facility: HOSPITAL | Age: 35
Discharge: HOME | End: 2025-08-24
Attending: STUDENT IN AN ORGANIZED HEALTH CARE EDUCATION/TRAINING PROGRAM
Payer: COMMERCIAL

## 2025-08-24 ENCOUNTER — APPOINTMENT (OUTPATIENT)
Facility: HOSPITAL | Age: 35
End: 2025-08-24
Payer: COMMERCIAL

## 2025-08-24 VITALS
WEIGHT: 315 LBS | BODY MASS INDEX: 44.1 KG/M2 | RESPIRATION RATE: 18 BRPM | DIASTOLIC BLOOD PRESSURE: 75 MMHG | TEMPERATURE: 97.7 F | HEIGHT: 71 IN | SYSTOLIC BLOOD PRESSURE: 128 MMHG | HEART RATE: 98 BPM | OXYGEN SATURATION: 92 %

## 2025-08-24 DIAGNOSIS — K64.9 HEMORRHOIDS, UNSPECIFIED HEMORRHOID TYPE: ICD-10-CM

## 2025-08-24 DIAGNOSIS — K62.5 RECTAL BLEEDING: Primary | ICD-10-CM

## 2025-08-24 LAB
ALBUMIN SERPL-MCNC: 3.3 G/DL (ref 3.5–5.2)
ALBUMIN/GLOB SERPL: 0.9 (ref 1.1–2.2)
ALP SERPL-CCNC: 101 U/L (ref 40–129)
ALT SERPL-CCNC: 28 U/L (ref 10–50)
ANION GAP SERPL CALC-SCNC: 12 MMOL/L (ref 2–14)
AST SERPL-CCNC: 24 U/L (ref 10–50)
BASOPHILS # BLD: 0 K/UL (ref 0–0.1)
BASOPHILS NFR BLD: 0 % (ref 0–1)
BILIRUB SERPL-MCNC: 0.4 MG/DL (ref 0–1.2)
BUN SERPL-MCNC: 13 MG/DL (ref 6–20)
BUN/CREAT SERPL: 14 (ref 12–20)
CALCIUM SERPL-MCNC: 9.4 MG/DL (ref 8.6–10)
CHLORIDE SERPL-SCNC: 106 MMOL/L (ref 98–107)
CO2 SERPL-SCNC: 24 MMOL/L (ref 20–29)
COMMENT:: NORMAL
CREAT SERPL-MCNC: 0.93 MG/DL (ref 0.7–1.2)
DIFFERENTIAL METHOD BLD: ABNORMAL
EOSINOPHIL # BLD: 0.28 K/UL (ref 0–0.4)
EOSINOPHIL NFR BLD: 2 % (ref 0–7)
ERYTHROCYTE [DISTWIDTH] IN BLOOD BY AUTOMATED COUNT: 14.7 % (ref 11.5–14.5)
GLOBULIN SER CALC-MCNC: 3.8 G/DL (ref 2–4)
GLUCOSE SERPL-MCNC: 89 MG/DL (ref 65–100)
HCT VFR BLD AUTO: 48.7 % (ref 36.6–50.3)
HGB BLD-MCNC: 16.4 G/DL (ref 12.1–17)
IMM GRANULOCYTES # BLD AUTO: 0 K/UL
IMM GRANULOCYTES NFR BLD AUTO: 0 %
INR PPP: 1 (ref 0.9–1.1)
LYMPHOCYTES # BLD: 4.87 K/UL (ref 0.8–3.5)
LYMPHOCYTES NFR BLD: 35 % (ref 12–49)
MCH RBC QN AUTO: 28.5 PG (ref 26–34)
MCHC RBC AUTO-ENTMCNC: 33.7 G/DL (ref 30–36.5)
MCV RBC AUTO: 84.7 FL (ref 80–99)
MONOCYTES # BLD: 1.53 K/UL (ref 0–1)
MONOCYTES NFR BLD: 11 % (ref 5–13)
NEUTS SEG # BLD: 7.22 K/UL (ref 1.8–8)
NEUTS SEG NFR BLD: 52 % (ref 32–75)
NRBC # BLD: 0 K/UL (ref 0–0.01)
NRBC BLD-RTO: 0 PER 100 WBC
PLATELET # BLD AUTO: 529 K/UL (ref 150–400)
PMV BLD AUTO: 8.5 FL (ref 8.9–12.9)
POTASSIUM SERPL-SCNC: 3.9 MMOL/L (ref 3.5–5.1)
PROT SERPL-MCNC: 7.2 G/DL (ref 6.4–8.3)
PROTHROMBIN TIME: 10.5 SEC (ref 9.2–11.2)
RBC # BLD AUTO: 5.75 M/UL (ref 4.1–5.7)
RBC MORPH BLD: ABNORMAL
SODIUM SERPL-SCNC: 142 MMOL/L (ref 136–145)
SPECIMEN HOLD: NORMAL
WBC # BLD AUTO: 13.9 K/UL (ref 4.1–11.1)
WBC MORPH BLD: ABNORMAL

## 2025-08-24 PROCEDURE — 85025 COMPLETE CBC W/AUTO DIFF WBC: CPT

## 2025-08-24 PROCEDURE — 99285 EMERGENCY DEPT VISIT HI MDM: CPT

## 2025-08-24 PROCEDURE — 85610 PROTHROMBIN TIME: CPT

## 2025-08-24 PROCEDURE — 80053 COMPREHEN METABOLIC PANEL: CPT

## 2025-08-24 PROCEDURE — 6360000004 HC RX CONTRAST MEDICATION: Performed by: STUDENT IN AN ORGANIZED HEALTH CARE EDUCATION/TRAINING PROGRAM

## 2025-08-24 PROCEDURE — 74174 CTA ABD&PLVS W/CONTRAST: CPT

## 2025-08-24 PROCEDURE — 36415 COLL VENOUS BLD VENIPUNCTURE: CPT

## 2025-08-24 RX ORDER — IOPAMIDOL 755 MG/ML
100 INJECTION, SOLUTION INTRAVASCULAR
Status: COMPLETED | OUTPATIENT
Start: 2025-08-24 | End: 2025-08-24

## 2025-08-24 RX ORDER — HYDROCORTISONE ACETATE 25 MG/1
25 SUPPOSITORY RECTAL 2 TIMES DAILY PRN
Qty: 12 SUPPOSITORY | Refills: 0 | Status: SHIPPED | OUTPATIENT
Start: 2025-08-24

## 2025-08-24 RX ADMIN — IOPAMIDOL 100 ML: 755 INJECTION, SOLUTION INTRAVENOUS at 20:58

## 2025-08-24 ASSESSMENT — PAIN SCALES - GENERAL: PAINLEVEL_OUTOF10: 0

## 2025-08-24 ASSESSMENT — PAIN - FUNCTIONAL ASSESSMENT: PAIN_FUNCTIONAL_ASSESSMENT: 0-10
